# Patient Record
Sex: FEMALE | Race: WHITE | NOT HISPANIC OR LATINO | URBAN - METROPOLITAN AREA
[De-identification: names, ages, dates, MRNs, and addresses within clinical notes are randomized per-mention and may not be internally consistent; named-entity substitution may affect disease eponyms.]

---

## 2020-07-09 ENCOUNTER — NEW PATIENT (OUTPATIENT)
Dept: URBAN - METROPOLITAN AREA CLINIC 69 | Facility: CLINIC | Age: 70
End: 2020-07-09

## 2020-07-09 DIAGNOSIS — H35.3111: ICD-10-CM

## 2020-07-09 DIAGNOSIS — H35.3122: ICD-10-CM

## 2020-07-09 PROCEDURE — 92202 OPSCPY EXTND ON/MAC DRAW: CPT

## 2020-07-09 PROCEDURE — 99204 OFFICE O/P NEW MOD 45 MIN: CPT

## 2020-07-09 PROCEDURE — 92134 CPTRZ OPH DX IMG PST SGM RTA: CPT

## 2020-07-09 ASSESSMENT — TONOMETRY
OD_IOP_MMHG: 12
OS_IOP_MMHG: 16

## 2020-07-09 ASSESSMENT — VISUAL ACUITY
OD_SC: 20/40-1
OS_PH: 20/25-2
OS_SC: 20/30-1
OD_PH: 20/30+3

## 2022-06-02 ENCOUNTER — FOLLOW UP (OUTPATIENT)
Dept: URBAN - METROPOLITAN AREA CLINIC 69 | Facility: CLINIC | Age: 72
End: 2022-06-02

## 2022-06-02 DIAGNOSIS — H35.3132: ICD-10-CM

## 2022-06-02 PROCEDURE — 92134 CPTRZ OPH DX IMG PST SGM RTA: CPT

## 2022-06-02 PROCEDURE — 92014 COMPRE OPH EXAM EST PT 1/>: CPT

## 2022-06-02 PROCEDURE — 92202 OPSCPY EXTND ON/MAC DRAW: CPT

## 2022-06-02 ASSESSMENT — VISUAL ACUITY
OD_PH: 20/25-2
OS_SC: 20/40+2
OS_PH: 20/30
OD_SC: 20/40+2

## 2022-06-02 ASSESSMENT — TONOMETRY
OD_IOP_MMHG: 16
OS_IOP_MMHG: 14

## 2022-12-01 ENCOUNTER — 6 MONTH FOLLOW UP (OUTPATIENT)
Dept: URBAN - METROPOLITAN AREA CLINIC 69 | Facility: CLINIC | Age: 72
End: 2022-12-01

## 2022-12-01 DIAGNOSIS — H35.3132: ICD-10-CM

## 2022-12-01 PROCEDURE — 92014 COMPRE OPH EXAM EST PT 1/>: CPT

## 2022-12-01 PROCEDURE — 92202 OPSCPY EXTND ON/MAC DRAW: CPT

## 2022-12-01 PROCEDURE — 92134 CPTRZ OPH DX IMG PST SGM RTA: CPT

## 2022-12-01 ASSESSMENT — TONOMETRY
OS_IOP_MMHG: 13
OD_IOP_MMHG: 14

## 2022-12-01 ASSESSMENT — VISUAL ACUITY
OS_SC: 20/40
OD_SC: 20/40-1

## 2023-01-08 ENCOUNTER — EMERGENCY (EMERGENCY)
Facility: HOSPITAL | Age: 73
LOS: 1 days | Discharge: HOME | End: 2023-01-08
Attending: EMERGENCY MEDICINE | Admitting: EMERGENCY MEDICINE
Payer: MEDICARE

## 2023-01-08 VITALS
HEART RATE: 90 BPM | RESPIRATION RATE: 16 BRPM | SYSTOLIC BLOOD PRESSURE: 143 MMHG | WEIGHT: 199.08 LBS | DIASTOLIC BLOOD PRESSURE: 93 MMHG | TEMPERATURE: 98 F | HEIGHT: 63 IN

## 2023-01-08 PROCEDURE — 73030 X-RAY EXAM OF SHOULDER: CPT | Mod: 26,LT

## 2023-01-08 PROCEDURE — 99285 EMERGENCY DEPT VISIT HI MDM: CPT

## 2023-01-08 PROCEDURE — 73060 X-RAY EXAM OF HUMERUS: CPT | Mod: 26,LT

## 2023-01-08 PROCEDURE — 73110 X-RAY EXAM OF WRIST: CPT | Mod: 26,LT

## 2023-01-08 RX ORDER — OXYCODONE HYDROCHLORIDE 5 MG/1
5 TABLET ORAL ONCE
Refills: 0 | Status: DISCONTINUED | OUTPATIENT
Start: 2023-01-08 | End: 2023-01-08

## 2023-01-08 RX ADMIN — OXYCODONE HYDROCHLORIDE 5 MILLIGRAM(S): 5 TABLET ORAL at 21:53

## 2023-01-08 NOTE — ED ADULT NURSE NOTE - CHIEF COMPLAINT QUOTE
Pt fell and hit head on grass, abrasion near left eye/temple. Pt takes eliquis. and thinks she broke left arm. denies LOC. sling placed in triage.

## 2023-01-08 NOTE — ED PROVIDER NOTE - SECONDARY DIAGNOSIS.
IHD completed, blood returned. Pt alert & stable. Thrill.bruit noted, hemostasis 15 minutes, dry occlusive dressing applied to sites. Duration 4.0hrs, Qb 400ml/min,Qd 800ml/min, UFG 1.5L. Report called. Pt transported to [facility] via bed with tele.    Head injury

## 2023-01-08 NOTE — ED PROVIDER NOTE - PHYSICAL EXAMINATION
Gen: Alert, NAD  Head/eyes: NC/AT, PERRL  ENT: airway patent  Neck: supple  Pulm/lung: Bilateral clear BS  CV/heart: RRR  GI/Abd: soft, NT/ND, +BS, no guarding/rebound tenderness  Musculoskeletal: no edema/erythema/cyanosis, +exquisite ttp left shoulder, no wrist deformity, no midline CTL spine ttp  Skin: no rash, left sided forehead abrasion  Neuro: AAOx3, grossly intact

## 2023-01-08 NOTE — ED ADULT TRIAGE NOTE - CHIEF COMPLAINT QUOTE
Pt fell and hit head on grass. Pt takes eliquis. and thinks she broke left arm. denies LOC. Pt fell and hit head on grass. Pt takes eliquis. and thinks she broke left arm. denies LOC. sling placed in triage. Pt fell and hit head on grass, abrasion near left eye/temple. Pt takes eliquis. and thinks she broke left arm. denies LOC. sling placed in triage.

## 2023-01-08 NOTE — ED PROVIDER NOTE - CARE PROVIDER_API CALL
Glenn Chauhan)  [facility]  Physicians  [address] [place], [place] [id number]  Phone: [phone]  Fax: [phone]  Follow Up Time:

## 2023-01-08 NOTE — ED PROVIDER NOTE - PATIENT PORTAL LINK FT
You can access the FollowMyHealth Patient Portal offered by [facility] by registering at the following website: [email or web address]. By joining [employer]’s FollowMyHealth portal, you will also be able to view your health information using other applications (apps) compatible with our system.

## 2023-01-08 NOTE — ED PROVIDER NOTE - OBJECTIVE STATEMENT
72-year-old female history of A. fib on Eliquis status post mechanical fall while getting onto a curb fell onto grass landed on outstretched hand on the left hip left side of the head against the grass no LOC no neck pain.  Complaining about moderate to severe left shoulder pain.  Placed in a sling upon arrival.  Denies any chest pain shortness of breath abdominal pain nausea vomiting.

## 2023-01-08 NOTE — ED ADULT NURSE NOTE - OBJECTIVE STATEMENT
72yr old female a&ox4 sitting up in stretcher family noted at bedside, pt notes to have trip and fell today, denies loc but notes to be on thinners. Pt speech noted to be clear, no facial droop noted, pt denies blurry vision, pt able to follow simple instructions. No other trauma noted to other extremity, pt also admits to taking PO Tylenol prior to coming to ED but notes some pain and discomfort. Extremity noted to be in sling to limit movement.

## 2023-01-08 NOTE — ED PROVIDER NOTE - NSFOLLOWUPINSTRUCTIONS_ED_ALL_ED_FT
1) Follow-up with your Primary Medical Doctor or referred doctor. Call today / next business day for prompt follow-up.  2) Return to Emergency room for any worsening or persistent pain, weakness, fever, or any other concerning symptoms.  3) See attached instruction sheets for additional information, including information regarding signs and symptoms to look out for, reasons to seek immediate care and other important instructions.  4) Follow-up with any specialists as discussed / noted as well.  5) Percocet 5mg every 6 hours as needed for pain.  Do not drive.        WHAT YOU NEED TO KNOW:    A proximal humerus fracture is a crack or break in the top of your upper arm bone. The proximal humerus is one of the bones in your shoulder joint.  Shoulder Anatomy         DISCHARGE INSTRUCTIONS:    Return to the emergency department if:   •Your pain does not get better or gets worse, even after you rest and take medicine.      •Your arm, hand, or fingers feel numb.      •The skin over your fracture is swollen, cold, or pale.      •You cannot move your arm, hand, or fingers.       Call your doctor or orthopedist if:   •You have a fever.      •Your sling gets wet, damaged, or falls off.      •You have questions or concerns about your condition or care.      Medicines: You may need any of the following:   •Prescription pain medicine may be given. Ask your healthcare provider how to take this medicine safely. Some prescription pain medicines contain acetaminophen. Do not take other medicines that contain acetaminophen without talking to your healthcare provider. Too much acetaminophen may cause liver damage. Prescription pain medicine may cause constipation. Ask your healthcare provider how to prevent or treat constipation.       •NSAIDs, such as ibuprofen, help decrease swelling, pain, and fever. This medicine is available with or without a doctor's order. NSAIDs can cause stomach bleeding or kidney problems in certain people. If you take blood thinner medicine, always ask your healthcare provider if NSAIDs are safe for you. Always read the medicine label and follow directions.      •Acetaminophen decreases pain and fever. It is available without a doctor's order. Ask how much to take and how often to take it. Follow directions. Read the labels of all other medicines you are using to see if they also contain acetaminophen, or ask your doctor or pharmacist. Acetaminophen can cause liver damage if not taken correctly.      •Take your medicine as directed. Contact your healthcare provider if you think your medicine is not helping or if you have side effects. Tell your provider if you are allergic to any medicine. Keep a list of the medicines, vitamins, and herbs you take. Include the amounts, and when and why you take them. Bring the list or the pill bottles to follow-up visits. Carry your medicine list with you in case of an emergency.      A sling may be needed to hold your broken bones in place. It will decrease your arm movement and allow the bones to heal.  Shoulder Sling         Manage your symptoms:   •Rest your arm as much as possible. Ask your healthcare provider when you can move your arm. Also ask when you can return to sports or vigorous exercises.      •Apply ice on your arm for 15 to 20 minutes every hour or as directed. Use an ice pack, or put crushed ice in a plastic bag. Cover it with a towel before you put it on your arm. Ice helps prevent tissue damage and decreases swelling and pain.      •Go to physical therapy as directed. A physical therapist teaches you exercises to help improve movement and strength, and to decrease pain.      Follow up with your doctor or orthopedist as directed: Write down your questions so you remember to ask them during your visits.

## 2023-01-09 VITALS
SYSTOLIC BLOOD PRESSURE: 117 MMHG | RESPIRATION RATE: 16 BRPM | TEMPERATURE: 98 F | OXYGEN SATURATION: 98 % | DIASTOLIC BLOOD PRESSURE: 83 MMHG | HEART RATE: 85 BPM

## 2023-01-09 PROCEDURE — 73030 X-RAY EXAM OF SHOULDER: CPT

## 2023-01-09 PROCEDURE — 70450 CT HEAD/BRAIN W/O DYE: CPT | Mod: 26,MA

## 2023-01-09 PROCEDURE — 70450 CT HEAD/BRAIN W/O DYE: CPT | Mod: MA

## 2023-01-09 PROCEDURE — 73060 X-RAY EXAM OF HUMERUS: CPT

## 2023-01-09 PROCEDURE — 99284 EMERGENCY DEPT VISIT MOD MDM: CPT | Mod: 25

## 2023-01-09 PROCEDURE — 73110 X-RAY EXAM OF WRIST: CPT

## 2023-01-09 RX ORDER — OXYCODONE AND ACETAMINOPHEN 5; 325 MG/1; MG/1
1 TABLET ORAL
Qty: 20 | Refills: 0
Start: 2023-01-09 | End: 2023-01-13

## 2023-01-09 RX ORDER — OXYCODONE AND ACETAMINOPHEN 5; 325 MG/1; MG/1
1 TABLET ORAL ONCE
Refills: 0 | Status: DISCONTINUED | OUTPATIENT
Start: 2023-01-09 | End: 2023-01-09

## 2023-01-09 RX ADMIN — OXYCODONE AND ACETAMINOPHEN 1 TABLET(S): 5; 325 TABLET ORAL at 01:51

## 2023-01-09 RX ADMIN — OXYCODONE HYDROCHLORIDE 5 MILLIGRAM(S): 5 TABLET ORAL at 00:08

## 2023-06-08 ENCOUNTER — 6 MONTH FOLLOW UP (OUTPATIENT)
Dept: URBAN - METROPOLITAN AREA CLINIC 69 | Facility: CLINIC | Age: 73
End: 2023-06-08

## 2023-06-08 DIAGNOSIS — H35.3132: ICD-10-CM

## 2023-06-08 PROCEDURE — 92134 CPTRZ OPH DX IMG PST SGM RTA: CPT

## 2023-06-08 PROCEDURE — 92014 COMPRE OPH EXAM EST PT 1/>: CPT

## 2023-06-08 ASSESSMENT — VISUAL ACUITY
OD_SC: 20/40-1
OS_SC: 20/40-2

## 2023-06-08 ASSESSMENT — TONOMETRY
OD_IOP_MMHG: 14
OS_IOP_MMHG: 13

## 2024-02-22 ENCOUNTER — 6 MONTH FOLLOW UP (OUTPATIENT)
Dept: URBAN - METROPOLITAN AREA CLINIC 69 | Facility: CLINIC | Age: 74
End: 2024-02-22

## 2024-02-22 DIAGNOSIS — H35.3132: ICD-10-CM

## 2024-02-22 PROCEDURE — 92202 OPSCPY EXTND ON/MAC DRAW: CPT

## 2024-02-22 PROCEDURE — 92134 CPTRZ OPH DX IMG PST SGM RTA: CPT

## 2024-02-22 PROCEDURE — 92014 COMPRE OPH EXAM EST PT 1/>: CPT

## 2024-02-22 ASSESSMENT — TONOMETRY
OS_IOP_MMHG: 16
OD_IOP_MMHG: 13

## 2024-02-22 ASSESSMENT — VISUAL ACUITY
OD_SC: 20/40-2
OS_SC: 20/50-2